# Patient Record
Sex: MALE | Employment: UNEMPLOYED | URBAN - METROPOLITAN AREA
[De-identification: names, ages, dates, MRNs, and addresses within clinical notes are randomized per-mention and may not be internally consistent; named-entity substitution may affect disease eponyms.]

---

## 2022-09-19 ENCOUNTER — HOSPITAL ENCOUNTER (EMERGENCY)
Age: 37
Discharge: LEFT BEFORE DISCHARGE | End: 2022-09-19
Payer: COMMERCIAL

## 2022-09-19 VITALS
DIASTOLIC BLOOD PRESSURE: 87 MMHG | SYSTOLIC BLOOD PRESSURE: 163 MMHG | TEMPERATURE: 98.5 F | HEART RATE: 98 BPM | RESPIRATION RATE: 20 BRPM | OXYGEN SATURATION: 96 %

## 2022-09-19 DIAGNOSIS — M79.642 HAND PAIN, LEFT: ICD-10-CM

## 2022-09-19 DIAGNOSIS — M79.641 HAND PAIN, RIGHT: ICD-10-CM

## 2022-09-19 DIAGNOSIS — M79.89 SWELLING OF BOTH HANDS: Primary | ICD-10-CM

## 2022-09-19 PROCEDURE — 99281 EMR DPT VST MAYX REQ PHY/QHP: CPT

## 2022-09-19 ASSESSMENT — PAIN SCALES - GENERAL: PAINLEVEL_OUTOF10: 6

## 2022-09-19 ASSESSMENT — PAIN - FUNCTIONAL ASSESSMENT: PAIN_FUNCTIONAL_ASSESSMENT: 0-10

## 2022-09-19 NOTE — ED PROVIDER NOTES
[facility]                                                                                                                                      Department of Emergency Medicine   ED  Provider Note  Admit Date/RoomTime: 9/19/2022  5:11 PM  ED Room: AN[facility]/AN[facility]        HPI:  9/19/22,   Time: 6:28 PM EDT         Shravan Garcia is a 42 y.o. male presenting to the ED for bilateral hand pain with numbness and tinglin, beginning few weeks ago. The complaint has been persistent, moderate in severity, and worsened by nothing. The patient states his symptoms are worse overnight. Denies injury or trauma. 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 States his hands feel swollen at times. Pain radiates to forearms. States he is worried because he is a former drug user as well. No redness or rash reported. Denies neck pain. States he has never been seen or worked up for this in past.   No other complaints. ROS:     Constitutional: Negative for fever and chills  HENT: Negative for ear pain, sore throat and sinus pressure  Eyes: Negative for pain, discharge and redness  Respiratory:  Negative for shortness of breath, cough and wheezing  Cardiovascular: Negative for CP, edema or palpitations  Gastrointestinal: Negative for nausea, vomiting, diarrhea and abdominal distention  Genitourinary: Negative for dysuria and frequency  Musculoskeletal: See HPI  Skin: Negative for rash and wound  Neurological: Negative for weakness and headaches  Hematological: Negative for adenopathy    All other systems reviewed and are negative      -------------------------------- PAST HISTORY ----------------------------------  Past Medical History:  has no past medical history on file. Past Surgical History:  has no past surgical history on file. Social History:  reports that he has been smoking cigarettes. He has been smoking an average of 1 pack per day.  He uses smokeless Affect      ------------------------ ED COURSE/MEDICAL DECISION MAKING----------------------  Medications - No data to display      Medical Decision Making:    Discussed with patient that I believe this is in fact CTS. Advised him that he needs to see Ortho/specialist.   Stated I would check a few labs and imaging in the meantime. Nursing reports they went to draw labs and patient was unable to be found. Appears to have left building. Counseling: The emergency provider has spoken with the patient and discussed todays results, in addition to providing specific details for the plan of care and counseling regarding the diagnosis and prognosis. Questions are answered at this time and they are agreeable with the plan.      ------------------------ IMPRESSION AND DISPOSITION -------------------------------    IMPRESSION  1. Swelling of both hands    2. Hand pain, right    3. Hand pain, left        DISPOSITION  Disposition: Left prior to treatment complete.     Patient condition is stable                   Rebecca Evans PA-C  09/19/22 [id number]

## 2022-09-19 NOTE — ED NOTES
Pt not in intake, not in imaging. No body saw pt leave department.      Bran Molina, MARQUIS  09/19/22 [id number]

## 2022-09-20 NOTE — ED NOTES
Pt departed ED prior to change of shift. Removing pt from board at this time.      Neela Olvera RN  09/19/22 [id number]

## 2025-08-29 ENCOUNTER — HOSPITAL ENCOUNTER (EMERGENCY)
Facility: HOSPITAL | Age: 40
Discharge: HOME | End: 2025-08-29
Attending: STUDENT IN AN ORGANIZED HEALTH CARE EDUCATION/TRAINING PROGRAM
Payer: COMMERCIAL

## 2025-08-29 ENCOUNTER — APPOINTMENT (OUTPATIENT)
Dept: RADIOLOGY | Facility: HOSPITAL | Age: 40
End: 2025-08-29
Payer: COMMERCIAL

## 2025-08-29 ASSESSMENT — PAIN DESCRIPTION - LOCATION: LOCATION: ANKLE

## 2025-08-29 ASSESSMENT — PAIN DESCRIPTION - ORIENTATION: ORIENTATION: RIGHT

## 2025-08-29 ASSESSMENT — PAIN SCALES - GENERAL: PAINLEVEL_OUTOF10: 10 - WORST POSSIBLE PAIN

## 2025-08-29 ASSESSMENT — PAIN - FUNCTIONAL ASSESSMENT: PAIN_FUNCTIONAL_ASSESSMENT: 0-10
